# Patient Record
Sex: MALE
[De-identification: names, ages, dates, MRNs, and addresses within clinical notes are randomized per-mention and may not be internally consistent; named-entity substitution may affect disease eponyms.]

---

## 2018-11-07 ENCOUNTER — HOSPITAL ENCOUNTER (OUTPATIENT)
Dept: HOSPITAL 89 - US | Age: 83
End: 2018-11-07
Attending: INTERNAL MEDICINE
Payer: MEDICARE

## 2018-11-07 DIAGNOSIS — I51.7: Primary | ICD-10-CM

## 2018-11-07 PROCEDURE — 93306 TTE W/DOPPLER COMPLETE: CPT

## 2018-11-12 ENCOUNTER — HOSPITAL ENCOUNTER (OUTPATIENT)
Dept: HOSPITAL 89 - LAB | Age: 83
End: 2018-11-12
Attending: INTERNAL MEDICINE
Payer: MEDICARE

## 2018-11-12 DIAGNOSIS — D75.89: ICD-10-CM

## 2018-11-12 DIAGNOSIS — I10: ICD-10-CM

## 2018-11-12 DIAGNOSIS — E03.9: Primary | ICD-10-CM

## 2018-11-12 DIAGNOSIS — E78.5: ICD-10-CM

## 2018-11-12 LAB
LDLC SERPL-MCNC: 27 MG/DL
PLATELET COUNT, AUTOMATED: 165 K/UL (ref 150–450)

## 2018-11-12 PROCEDURE — 83036 HEMOGLOBIN GLYCOSYLATED A1C: CPT

## 2018-11-12 PROCEDURE — 84443 ASSAY THYROID STIM HORMONE: CPT

## 2018-11-12 PROCEDURE — 85025 COMPLETE CBC W/AUTO DIFF WBC: CPT

## 2018-11-12 PROCEDURE — 84155 ASSAY OF PROTEIN SERUM: CPT

## 2018-11-12 PROCEDURE — 82435 ASSAY OF BLOOD CHLORIDE: CPT

## 2018-11-12 PROCEDURE — 82947 ASSAY GLUCOSE BLOOD QUANT: CPT

## 2018-11-12 PROCEDURE — 84478 ASSAY OF TRIGLYCERIDES: CPT

## 2018-11-12 PROCEDURE — 83718 ASSAY OF LIPOPROTEIN: CPT

## 2018-11-12 PROCEDURE — 84520 ASSAY OF UREA NITROGEN: CPT

## 2018-11-12 PROCEDURE — 36415 COLL VENOUS BLD VENIPUNCTURE: CPT

## 2018-11-12 PROCEDURE — 82565 ASSAY OF CREATININE: CPT

## 2018-11-12 PROCEDURE — 82374 ASSAY BLOOD CARBON DIOXIDE: CPT

## 2018-11-12 PROCEDURE — 82465 ASSAY BLD/SERUM CHOLESTEROL: CPT

## 2018-11-12 PROCEDURE — 82040 ASSAY OF SERUM ALBUMIN: CPT

## 2018-11-12 PROCEDURE — 82247 BILIRUBIN TOTAL: CPT

## 2018-11-12 PROCEDURE — 84295 ASSAY OF SERUM SODIUM: CPT

## 2018-11-12 PROCEDURE — 84075 ASSAY ALKALINE PHOSPHATASE: CPT

## 2018-11-12 PROCEDURE — 84460 ALANINE AMINO (ALT) (SGPT): CPT

## 2018-11-12 PROCEDURE — 82310 ASSAY OF CALCIUM: CPT

## 2018-11-12 PROCEDURE — 82746 ASSAY OF FOLIC ACID SERUM: CPT

## 2018-11-12 PROCEDURE — 84450 TRANSFERASE (AST) (SGOT): CPT

## 2018-11-12 PROCEDURE — 84132 ASSAY OF SERUM POTASSIUM: CPT

## 2018-11-24 ENCOUNTER — HOSPITAL ENCOUNTER (OUTPATIENT)
Dept: HOSPITAL 89 - RESP | Age: 83
End: 2018-11-24
Attending: INTERNAL MEDICINE
Payer: MEDICARE

## 2018-11-24 DIAGNOSIS — G47.36: ICD-10-CM

## 2018-11-24 DIAGNOSIS — G47.61: ICD-10-CM

## 2018-11-24 DIAGNOSIS — G47.33: Primary | ICD-10-CM

## 2018-12-13 ENCOUNTER — HOSPITAL ENCOUNTER (OUTPATIENT)
Dept: HOSPITAL 89 - RESP | Age: 83
End: 2018-12-13
Attending: INTERNAL MEDICINE
Payer: MEDICARE

## 2018-12-13 DIAGNOSIS — I25.10: Primary | ICD-10-CM

## 2018-12-13 LAB — LDLC SERPL-MCNC: 61 MG/DL

## 2018-12-13 PROCEDURE — 84478 ASSAY OF TRIGLYCERIDES: CPT

## 2018-12-13 PROCEDURE — 78452 HT MUSCLE IMAGE SPECT MULT: CPT

## 2018-12-13 PROCEDURE — 93017 CV STRESS TEST TRACING ONLY: CPT

## 2018-12-13 PROCEDURE — 36415 COLL VENOUS BLD VENIPUNCTURE: CPT

## 2018-12-13 PROCEDURE — 82465 ASSAY BLD/SERUM CHOLESTEROL: CPT

## 2018-12-13 PROCEDURE — 83718 ASSAY OF LIPOPROTEIN: CPT

## 2018-12-13 NOTE — RADIOLOGY IMAGING REPORT
FACILITY: [facility] - [place] 

 

PATIENT NAME: Ellis Plascencia

: 1935

MR: [id number]

V: [id number]

EXAM DATE: 

ORDERING PHYSICIAN: SHAREE PULIDO

TECHNOLOGIST: 

 

[place] [facility] [place]

Patient: Ellis Plascencia

: 1935

MRN: [id number]

Visit/Account:[id number]

Date of Sevice: 2018

 

ACCESSION #: [id number]

 

EXAMINATION:  Single isotope SPECT imaging with regadenoson infusion and gated SPECT imaging.

 

DATE OF EXAMINATION:  2018.

 

DATE OF INTERPRETATION:  2018.

 

REQUESTING PHYSICIAN:  SHAREE PULIDO.

 

INDICATION:  The patient is a 83-year-old male evaluated for CAD.

 

PROCEDURE:  After informed consent the patient received an intravenous injection of 12.1 mCi of Tc-99
m sestamibi followed at an appropriate time interval by rest imaging.  The patient then subsequently 
received an intravenous infusion of 0.4 mg of regadenoson per protocol without complication.  Resting
 heart rate was 51 bpm with a peak heart rate of 74 bpm.  Blood pressure at rest was 129 / 70 and fol
lowing infusion was 129 / 70.  Baseline EKG demonstrates normal sinus rhythm, right bundle branch blo
ck.  There were no new EKG changes of ischemia following infusion.  Symptoms were nonspecific.  The p
atient then received an intravenous injection of 29.2 mCi of Tc-99m sestamibi followed by stress imag
ing.

 

RAW DATA:  Examination of the summed raw data revealed a good quality study with diaphragmatic attenu
ation quality study.

 

MYOCARDIAL PERFUSION:  The tomographic images demonstrate no evidence of ischemia or infarct..

 

GATED IMAGES:  The gated images demonstrate normal wall motion, ejection fraction 66%.

 

IMPRESSION:

 

1.  Good quality study with diaphragmatic attenuation that corrects with prone imaging

2.  Normal myocardial perfusion scan.

3.  Normal LV systolic function; LVEF 66%.

4. Based on the results of this exam, the patient appears to be at low risk for future cardiovascular
 events but remains intermediate risk due to history of CAD.

 

Report Dictated By: Jamin Cuellar at 2018 4:49 PM

 

Report E-Signed By: Jamin Cuellar  at 2018 4:51 PM

 

WSN:[id number]

## 2018-12-13 NOTE — RT STRESS TEST REPORT
FACILITY: [facility] - [place] 

 

PATIENT NAME: ТАТЬЯНА CAMACHO

: [id number]

MR: [id number]

V: [id number]

EXAM DATE: 

ORDERING PHYSICIAN: SHAREE PULIDO

TECHNOLOGIST: Maurisio

 

Acquisition Time: 2018  14:37:18

Total Exercise Time: 00:01:00

Test Indications: CAD, SOB

Medications: SEE NUCLEAR MED SHEET

              

              

              

              

              

              

              

              

              

Protocol: LEXISCAN        

Max HR: 074 BPM  54% of  Pred: 137 BPM

Max BP: 129/070 mmHG

Max Work Load: 1.0 METS

 

Stress was performed using Lexiscan Protocol. The patient did not have any significant symptoms with 
the infusion.  No  significant EKG changes were

noted. No dysrhythmias were noted. Recovery was uneventful. Await Myoview images.

Confirmed by KAREN CARTER ([id number]) on 2018 3:27:01 PM

 

Referred By:             Overread By: KAREN CARTER

## 2019-01-07 ENCOUNTER — HOSPITAL ENCOUNTER (OUTPATIENT)
Dept: HOSPITAL 89 - LAB | Age: 84
End: 2019-01-07
Attending: INTERNAL MEDICINE
Payer: MEDICARE

## 2019-01-07 DIAGNOSIS — E03.9: Primary | ICD-10-CM

## 2019-01-07 PROCEDURE — 84443 ASSAY THYROID STIM HORMONE: CPT

## 2019-01-07 PROCEDURE — 36415 COLL VENOUS BLD VENIPUNCTURE: CPT

## 2019-02-04 ENCOUNTER — HOSPITAL ENCOUNTER (OUTPATIENT)
Dept: HOSPITAL 89 - LAB | Age: 84
End: 2019-02-04
Attending: INTERNAL MEDICINE
Payer: MEDICARE

## 2019-02-04 DIAGNOSIS — E03.9: Primary | ICD-10-CM

## 2019-02-04 PROCEDURE — 36415 COLL VENOUS BLD VENIPUNCTURE: CPT

## 2019-02-04 PROCEDURE — 84443 ASSAY THYROID STIM HORMONE: CPT

## 2019-03-21 ENCOUNTER — HOSPITAL ENCOUNTER (OUTPATIENT)
Dept: HOSPITAL 89 - RAD | Age: 84
End: 2019-03-21
Attending: INTERNAL MEDICINE
Payer: MEDICARE

## 2019-03-21 DIAGNOSIS — I51.7: Primary | ICD-10-CM

## 2019-03-21 PROCEDURE — 71046 X-RAY EXAM CHEST 2 VIEWS: CPT

## 2019-03-21 NOTE — RADIOLOGY IMAGING REPORT
FACILITY: [facility] 

 

PATIENT NAME: Ellis Plascencia

: 1935

MR: [id number]

V: [id number]

EXAM DATE: 

ORDERING PHYSICIAN: SAMEER LAGUERRE

TECHNOLOGIST: 

 

Location: [facility] - [place]

Patient: Ellis Plascencia

: 1935

MRN: [id number]

Visit/Account:[id number]

Date of Sevice:  3/21/2019

 

ACCESSION #: [id number]

 

Exam type: CHEST PA LAT

 

History: Cough

 

Comparison: None.

 

Findings:

 

There is flattening the hemidiaphragms which can be seen with hyperinflation.  There is no evidence o
f focal infiltrates, pleural effusions or pulmonary edema.  The cardiac silhouette is mildly enlarged
.  There is mild ectasia the thoracic aorta.  There are moderate spondylotic changes of the thoracic 
spine.

 

IMPRESSION:

 

1.  Hyperinflation of the lung fields although no evidence of acute appearing infiltrates

 

 

Mild cardiomegaly

 

Results were called to SAMEER LAGUERRE at 3/21/2019 3:24 PM.

 

Report Dictated By: Maryan Rivas MD at 3/21/2019 3:23 PM

 

Report E-Signed By: Maryan Rivas MD  at 3/21/2019 3:26 PM

 

WSN:[id number]

## 2019-03-29 ENCOUNTER — HOSPITAL ENCOUNTER (OUTPATIENT)
Dept: HOSPITAL 89 - LAB | Age: 84
End: 2019-03-29
Attending: INTERNAL MEDICINE
Payer: MEDICARE

## 2019-03-29 DIAGNOSIS — E03.9: Primary | ICD-10-CM

## 2019-03-29 PROCEDURE — 36415 COLL VENOUS BLD VENIPUNCTURE: CPT

## 2019-03-29 PROCEDURE — 84443 ASSAY THYROID STIM HORMONE: CPT

## 2019-04-10 ENCOUNTER — HOSPITAL ENCOUNTER (OUTPATIENT)
Dept: HOSPITAL 89 - LAB | Age: 84
End: 2019-04-10
Attending: INTERNAL MEDICINE
Payer: MEDICARE

## 2019-04-10 DIAGNOSIS — I25.10: Primary | ICD-10-CM

## 2019-04-10 LAB — LDLC SERPL-MCNC: 67 MG/DL

## 2019-04-10 PROCEDURE — 82565 ASSAY OF CREATININE: CPT

## 2019-04-10 PROCEDURE — 84155 ASSAY OF PROTEIN SERUM: CPT

## 2019-04-10 PROCEDURE — 82435 ASSAY OF BLOOD CHLORIDE: CPT

## 2019-04-10 PROCEDURE — 84075 ASSAY ALKALINE PHOSPHATASE: CPT

## 2019-04-10 PROCEDURE — 84520 ASSAY OF UREA NITROGEN: CPT

## 2019-04-10 PROCEDURE — 84132 ASSAY OF SERUM POTASSIUM: CPT

## 2019-04-10 PROCEDURE — 83718 ASSAY OF LIPOPROTEIN: CPT

## 2019-04-10 PROCEDURE — 82310 ASSAY OF CALCIUM: CPT

## 2019-04-10 PROCEDURE — 82374 ASSAY BLOOD CARBON DIOXIDE: CPT

## 2019-04-10 PROCEDURE — 84450 TRANSFERASE (AST) (SGOT): CPT

## 2019-04-10 PROCEDURE — 82947 ASSAY GLUCOSE BLOOD QUANT: CPT

## 2019-04-10 PROCEDURE — 82247 BILIRUBIN TOTAL: CPT

## 2019-04-10 PROCEDURE — 82040 ASSAY OF SERUM ALBUMIN: CPT

## 2019-04-10 PROCEDURE — 84460 ALANINE AMINO (ALT) (SGPT): CPT

## 2019-04-10 PROCEDURE — 82465 ASSAY BLD/SERUM CHOLESTEROL: CPT

## 2019-04-10 PROCEDURE — 84478 ASSAY OF TRIGLYCERIDES: CPT

## 2019-04-10 PROCEDURE — 84295 ASSAY OF SERUM SODIUM: CPT

## 2019-04-10 PROCEDURE — 36415 COLL VENOUS BLD VENIPUNCTURE: CPT

## 2019-06-03 ENCOUNTER — HOSPITAL ENCOUNTER (OUTPATIENT)
Dept: HOSPITAL 89 - LAB | Age: 84
End: 2019-06-03
Attending: INTERNAL MEDICINE
Payer: MEDICARE

## 2019-06-03 DIAGNOSIS — E03.9: Primary | ICD-10-CM

## 2019-06-03 PROCEDURE — 84443 ASSAY THYROID STIM HORMONE: CPT

## 2019-06-03 PROCEDURE — 36415 COLL VENOUS BLD VENIPUNCTURE: CPT
